# Patient Record
(demographics unavailable — no encounter records)

---

## 2019-02-12 NOTE — EKG
Test Reason : 

Blood Pressure : ***/*** mmHG

Vent. Rate : 073 BPM     Atrial Rate : 073 BPM

   P-R Int : 118 ms          QRS Dur : 100 ms

    QT Int : 360 ms       P-R-T Axes : 062 -55 038 degrees

   QTc Int : 396 ms

 

NORMAL SINUS RHYTHM

LEFT ANTERIOR FASCICULAR BLOCK

ABNORMAL ECG

WHEN COMPARED WITH ECG OF 29-MAR-2015 17:18,

NO SIGNIFICANT CHANGE WAS FOUND

Confirmed by Lyle Mayo ([id number]) on 2/12/2019 4:24:25 PM

 

Referred By:             Confirmed By:Lyle Mayo
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
